# Patient Record
Sex: FEMALE | Race: BLACK OR AFRICAN AMERICAN | NOT HISPANIC OR LATINO | Employment: PART TIME | URBAN - METROPOLITAN AREA
[De-identification: names, ages, dates, MRNs, and addresses within clinical notes are randomized per-mention and may not be internally consistent; named-entity substitution may affect disease eponyms.]

---

## 2017-09-12 ENCOUNTER — CLINICAL SUPPORT (OUTPATIENT)
Dept: OCCUPATIONAL MEDICINE | Facility: CLINIC | Age: 37
End: 2017-09-12

## 2017-09-12 DIAGNOSIS — Z02.1 DRUG SCREENING, PRE-EMPLOYMENT: Primary | ICD-10-CM

## 2017-09-12 PROCEDURE — 80305 DRUG TEST PRSMV DIR OPT OBS: CPT | Mod: S$GLB,,, | Performed by: NURSE PRACTITIONER

## 2019-05-28 ENCOUNTER — HOSPITAL ENCOUNTER (EMERGENCY)
Facility: HOSPITAL | Age: 39
Discharge: HOME | End: 2019-05-28
Attending: EMERGENCY MEDICINE

## 2019-05-28 VITALS
WEIGHT: 185 LBS | TEMPERATURE: 98 F | OXYGEN SATURATION: 99 % | HEART RATE: 80 BPM | DIASTOLIC BLOOD PRESSURE: 100 MMHG | SYSTOLIC BLOOD PRESSURE: 168 MMHG | RESPIRATION RATE: 16 BRPM

## 2019-05-28 DIAGNOSIS — R03.0 ELEVATED BLOOD PRESSURE READING: ICD-10-CM
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
## 2019-05-28 PROCEDURE — 99281 EMR DPT VST MAYX REQ PHY/QHP: CPT | Mod: ER

## 2019-05-28 NOTE — DISCHARGE INSTRUCTIONS
Establish care with a primary care physician.    Please return to this ED if any other problems occur.
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
## 2020-02-06 ENCOUNTER — HOSPITAL ENCOUNTER (EMERGENCY)
Facility: HOSPITAL | Age: 40
Discharge: HOME | End: 2020-02-06
Attending: EMERGENCY MEDICINE
Payer: COMMERCIAL

## 2020-02-06 VITALS
TEMPERATURE: 98 F | BODY MASS INDEX: 28.93 KG/M2 | RESPIRATION RATE: 18 BRPM | HEART RATE: 76 BPM | SYSTOLIC BLOOD PRESSURE: 143 MMHG | OXYGEN SATURATION: 99 % | WEIGHT: 180 LBS | DIASTOLIC BLOOD PRESSURE: 78 MMHG | HEIGHT: 66 IN

## 2020-02-06 DIAGNOSIS — J06.9 URI WITH COUGH AND CONGESTION: Primary | ICD-10-CM

## 2020-02-06 LAB
B-HCG UR QL: NEGATIVE
CTP QC/QA: YES
CTP QC/QA: YES
POC MOLECULAR INFLUENZA A AGN: NEGATIVE
POC MOLECULAR INFLUENZA B AGN: NEGATIVE

## 2020-02-06 PROCEDURE — 99284 EMERGENCY DEPT VISIT MOD MDM: CPT | Mod: 25,ER

## 2020-02-06 PROCEDURE — 81025 URINE PREGNANCY TEST: CPT | Mod: ER | Performed by: NURSE PRACTITIONER

## 2020-02-06 PROCEDURE — 63600175 PHARM REV CODE 636 W HCPCS: Mod: ER | Performed by: NURSE PRACTITIONER

## 2020-02-06 PROCEDURE — 87502 INFLUENZA DNA AMP PROBE: CPT | Mod: ER

## 2020-02-06 PROCEDURE — 96372 THER/PROPH/DIAG INJ SC/IM: CPT | Mod: ER

## 2020-02-06 RX ORDER — FLUTICASONE PROPIONATE 50 MCG
1 SPRAY, SUSPENSION (ML) NASAL 2 TIMES DAILY
Qty: 15 G | Refills: 0 | OUTPATIENT
Start: 2020-02-06 | End: 2020-02-11

## 2020-02-06 RX ORDER — BENZONATATE 100 MG/1
100 CAPSULE ORAL EVERY 8 HOURS PRN
Qty: 20 CAPSULE | Refills: 0 | Status: SHIPPED | OUTPATIENT
Start: 2020-02-06

## 2020-02-06 RX ORDER — LEVOCETIRIZINE DIHYDROCHLORIDE 5 MG/1
5 TABLET, FILM COATED ORAL NIGHTLY
Qty: 30 TABLET | Refills: 0 | Status: SHIPPED | OUTPATIENT
Start: 2020-02-06

## 2020-02-06 RX ORDER — DEXAMETHASONE SODIUM PHOSPHATE 4 MG/ML
4 INJECTION, SOLUTION INTRA-ARTICULAR; INTRALESIONAL; INTRAMUSCULAR; INTRAVENOUS; SOFT TISSUE
Status: COMPLETED | OUTPATIENT
Start: 2020-02-06 | End: 2020-02-06

## 2020-02-06 RX ADMIN — DEXAMETHASONE SODIUM PHOSPHATE 4 MG: 4 INJECTION, SOLUTION INTRA-ARTICULAR; INTRALESIONAL; INTRAMUSCULAR; INTRAVENOUS; SOFT TISSUE at 09:02

## 2020-02-06 NOTE — ED PROVIDER NOTES
Encounter Date: 2/6/2020    SCRIBE #1 NOTE: I, Kelle Sauer, am scribing for, and in the presence of,  Toussaint Battley, FNP. I have scribed the following portions of the note - Other sections scribed: HPI, ROS, PE.       History     Chief Complaint   Patient presents with    Cough     sinus, body aches, sore throat, onset saturday     The history is provided by the patient. No  was used.   Cough   This is a new problem. The current episode started several days ago. The problem occurs every few minutes. The problem has been unchanged. The cough is non-productive. There has been no fever. Associated symptoms include sore throat and myalgias. Pertinent negatives include no chest pain, no chills, no sweats, no ear pain, no headaches, no rhinorrhea, no shortness of breath, no wheezing and no eye redness. She has tried cough syrup and decongestants for the symptoms. The treatment provided no relief. She is not a smoker.   Sore Throat    This is a new problem. The current episode started several days ago. The problem has been unchanged. There has been no fever. Associated symptoms include congestion, coughing and a hoarse voice. Pertinent negatives include no abdominal pain, diarrhea, ear pain, headaches, neck pain, shortness of breath, stridor, trouble swallowing or vomiting. She has tried acetaminophen and NSAIDs for the symptoms. The treatment provided no relief.     Review of patient's allergies indicates:   Allergen Reactions    Latex Itching     History reviewed. No pertinent past medical history.  Past Surgical History:   Procedure Laterality Date    ABLATION       History reviewed. No pertinent family history.  Social History     Tobacco Use    Smoking status: Never Smoker    Smokeless tobacco: Never Used   Substance Use Topics    Alcohol use: Yes     Comment: occ    Drug use: Not on file     Review of Systems   Constitutional: Negative.  Negative for chills and fever.   HENT: Positive for  congestion, hoarse voice, sinus pressure, sinus pain, sore throat and voice change (hoarse). Negative for ear pain, rhinorrhea and trouble swallowing.    Eyes: Negative.  Negative for redness.   Respiratory: Positive for cough. Negative for shortness of breath, wheezing and stridor.    Cardiovascular: Negative.  Negative for chest pain.   Gastrointestinal: Negative.  Negative for abdominal pain, diarrhea, nausea and vomiting.   Endocrine: Negative.    Genitourinary: Negative.  Negative for dysuria.   Musculoskeletal: Positive for myalgias. Negative for neck pain.   Skin: Negative.  Negative for rash.   Allergic/Immunologic: Negative.    Neurological: Negative.  Negative for headaches.   Hematological: Negative.  Negative for adenopathy.   Psychiatric/Behavioral: Negative.  Negative for behavioral problems.   All other systems reviewed and are negative.      Physical Exam     Initial Vitals [02/06/20 0909]   BP Pulse Resp Temp SpO2   (!) 158/100 80 19 98.3 °F (36.8 °C) 98 %      MAP       --         Physical Exam    Nursing note and vitals reviewed.  Constitutional: She appears well-developed and well-nourished.   HENT:   Head: Normocephalic and atraumatic.   Right Ear: Tympanic membrane, external ear and ear canal normal.   Left Ear: Tympanic membrane, external ear and ear canal normal.   Nose: Mucosal edema present.   Mouth/Throat: Uvula is midline, oropharynx is clear and moist and mucous membranes are normal.   Eyes: Conjunctivae and EOM are normal. Pupils are equal, round, and reactive to light.   Neck: Normal range of motion and phonation normal. Neck supple.   Cardiovascular: Normal rate, regular rhythm, S1 normal, S2 normal, normal heart sounds and intact distal pulses. Exam reveals no gallop and no friction rub.    No murmur heard.  Pulmonary/Chest: Effort normal and breath sounds normal. No stridor. No respiratory distress. She has no decreased breath sounds. She has no wheezes. She has no rhonchi. She has  no rales. She exhibits no tenderness.   Abdominal: Soft. She exhibits no distension. There is no tenderness. There is no rigidity.   Musculoskeletal: Normal range of motion. She exhibits no edema or tenderness.   Neurological: She is alert and oriented to person, place, and time. She has normal strength. No cranial nerve deficit or sensory deficit. GCS score is 15. GCS eye subscore is 4. GCS verbal subscore is 5. GCS motor subscore is 6.   Skin: Skin is warm and dry. Capillary refill takes less than 2 seconds. No rash noted.   Psychiatric: She has a normal mood and affect. Her behavior is normal.         ED Course   Procedures  Labs Reviewed   POCT INFLUENZA A/B MOLECULAR   POCT URINE PREGNANCY          Imaging Results    None          Medical Decision Making:   History:   Old Medical Records: I decided to obtain old medical records.  Initial Assessment:   URI  Differential Diagnosis:   Tonsillar exudate, influenza  Clinical Tests:   Lab Tests: Ordered and Reviewed  ED Management:  No evidence of tonsillar exudate. Lung sounds were clear and patient is afebrile and does not have history of smoking.  No imaging is warranted at this time.  Decadron IM given in the ER for the patient's symptoms.    1) Pt will be discharged home on Tessalon PerlesPaulazal on Flonase  2) Pt instructed to drink plenty of fluids to loosen secretions  3) Pt instructed that he may take over-the-counter Mucinex (NOT DM) as needed  4) Pt instructed to take over-the-counter Tylenol or Motrin for fever/body aches   5) Pt instructed to return to ER as needed if symptoms worsen/fail to improve  6) Pt instructed to follow-up with primary care provider tomorrow  7) Pt verbalized understanding of discharge instructions and treatment plan              Scribe Attestation:   Scribe #1: I performed the above scribed service and the documentation accurately describes the services I performed. I attest to the accuracy of the note.                           Clinical Impression:     1. URI with cough and congestion                                Toussaint Battley III, FNP  02/06/20 0946

## 2020-02-11 ENCOUNTER — HOSPITAL ENCOUNTER (EMERGENCY)
Facility: HOSPITAL | Age: 40
Discharge: HOME | End: 2020-02-11
Attending: EMERGENCY MEDICINE
Payer: COMMERCIAL

## 2020-02-11 VITALS
BODY MASS INDEX: 28.93 KG/M2 | OXYGEN SATURATION: 98 % | HEIGHT: 66 IN | RESPIRATION RATE: 19 BRPM | HEART RATE: 88 BPM | WEIGHT: 180 LBS | SYSTOLIC BLOOD PRESSURE: 155 MMHG | DIASTOLIC BLOOD PRESSURE: 102 MMHG | TEMPERATURE: 98 F

## 2020-02-11 DIAGNOSIS — I10 ESSENTIAL HYPERTENSION: ICD-10-CM

## 2020-02-11 DIAGNOSIS — R51.9 SINUS HEADACHE: Primary | ICD-10-CM

## 2020-02-11 LAB
B-HCG UR QL: NEGATIVE
CTP QC/QA: YES

## 2020-02-11 PROCEDURE — 81025 URINE PREGNANCY TEST: CPT | Mod: ER | Performed by: EMERGENCY MEDICINE

## 2020-02-11 PROCEDURE — 25000003 PHARM REV CODE 250: Mod: ER | Performed by: EMERGENCY MEDICINE

## 2020-02-11 PROCEDURE — 99284 EMERGENCY DEPT VISIT MOD MDM: CPT | Mod: 25,ER

## 2020-02-11 RX ORDER — AMLODIPINE BESYLATE 10 MG/1
10 TABLET ORAL DAILY
Qty: 30 TABLET | Refills: 0 | Status: SHIPPED | OUTPATIENT
Start: 2020-02-11 | End: 2021-02-10

## 2020-02-11 RX ORDER — FLUTICASONE PROPIONATE 50 MCG
2 SPRAY, SUSPENSION (ML) NASAL DAILY
Qty: 16 G | Refills: 0 | Status: SHIPPED | OUTPATIENT
Start: 2020-02-11

## 2020-02-11 RX ORDER — MONTELUKAST SODIUM 10 MG/1
10 TABLET ORAL NIGHTLY
Qty: 30 TABLET | Refills: 0 | Status: SHIPPED | OUTPATIENT
Start: 2020-02-11 | End: 2020-03-12

## 2020-02-11 RX ORDER — KETOROLAC TROMETHAMINE 10 MG/1
10 TABLET, FILM COATED ORAL
Status: COMPLETED | OUTPATIENT
Start: 2020-02-11 | End: 2020-02-11

## 2020-02-11 RX ORDER — PROMETHAZINE HYDROCHLORIDE AND DEXTROMETHORPHAN HYDROBROMIDE 6.25; 15 MG/5ML; MG/5ML
5 SYRUP ORAL NIGHTLY PRN
Qty: 118 ML | Refills: 0 | Status: SHIPPED | OUTPATIENT
Start: 2020-02-11 | End: 2020-02-21

## 2020-02-11 RX ORDER — BUTALBITAL, ACETAMINOPHEN AND CAFFEINE 50; 325; 40 MG/1; MG/1; MG/1
1 TABLET ORAL
Status: COMPLETED | OUTPATIENT
Start: 2020-02-11 | End: 2020-02-11

## 2020-02-11 RX ORDER — METHYLPREDNISOLONE 4 MG/1
TABLET ORAL
Qty: 1 PACKAGE | Refills: 0 | Status: SHIPPED | OUTPATIENT
Start: 2020-02-11 | End: 2020-03-03

## 2020-02-11 RX ADMIN — BUTALBITAL, ACETAMINOPHEN AND CAFFEINE 1 TABLET: 50; 325; 40 TABLET ORAL at 09:02

## 2020-02-11 RX ADMIN — KETOROLAC TROMETHAMINE 10 MG: 10 TABLET, FILM COATED ORAL at 09:02

## 2020-02-12 NOTE — ED TRIAGE NOTES
39 y.o. Female to ED with c.o. dizziness and headaches x 2 hours. Patient reports she took her blood pressure at home and it was 145/114. Patient reports she has never been prescribed blood pressure medication. Patient denies blurred vision, denies weakness, denies chest pain. Blood pressure 158/112 at triage.

## 2020-02-12 NOTE — ED PROVIDER NOTES
Encounter Date: 2/11/2020       History     Chief Complaint   Patient presents with    Hypertension     145/114    Headache     39 y.o. Female presents to the emergency department complaining of acute nasal congestion, runny nose, postnasal drip, productive cough, sneezing, right-sided, throbbing, 8/10, headache, intermittent dizziness (described as a room spinning sensation) that began two days ago.  She reports being seen for upper respiratory symptoms last week and states symptoms improved after receiving a steroid shot.  She reports taking her blood pressure today while she was at work and states her blood pressure was 145/114.  She reports coming to the ED concerned about elevated blood pressure.  She denies neck stiffness, photophobia, fever, nausea, vomiting, gait disturbance or focal weakness.  She denies taking medication for symptoms.    Does not acknowledge pre-existing HTN and states she does not take prescription BP medication.         Review of patient's allergies indicates:   Allergen Reactions    Latex Itching     No past medical history on file.  Past Surgical History:   Procedure Laterality Date    ABLATION       No family history on file.  Social History     Tobacco Use    Smoking status: Never Smoker    Smokeless tobacco: Never Used   Substance Use Topics    Alcohol use: Yes     Comment: occ    Drug use: Not on file     Review of Systems   Constitutional: Negative for fever.   HENT: Positive for congestion, postnasal drip, rhinorrhea, sinus pressure and sneezing. Negative for drooling, sore throat, trouble swallowing and voice change.    Eyes: Negative for photophobia and visual disturbance.   Musculoskeletal: Negative for neck stiffness.   Skin: Negative for rash.   Neurological: Positive for dizziness and headaches. Negative for syncope and weakness.   All other systems reviewed and are negative.      Physical Exam     Initial Vitals [02/11/20 2040]   BP Pulse Resp Temp SpO2   (!)  158/112 86 16 98 °F (36.7 °C) 98 %      MAP       --         Physical Exam    Nursing note and vitals reviewed.  Constitutional: She appears well-developed and well-nourished. She is not diaphoretic. No distress.   HENT:   Head: Normocephalic and atraumatic.   Right Ear: Tympanic membrane is injected and bulging. Tympanic membrane is not perforated and not erythematous. A middle ear effusion (serous) is present.   Left Ear: Tympanic membrane is bulging. Tympanic membrane is not injected, not perforated and not erythematous. A middle ear effusion (serous) is present.   Mouth/Throat: Oropharynx is clear and moist.   Eyes: Conjunctivae are normal. Pupils are equal, round, and reactive to light.   Neck: Normal range of motion, full passive range of motion without pain and phonation normal. Neck supple. No stridor present.   Cardiovascular: Normal rate and intact distal pulses.   Pulmonary/Chest: No accessory muscle usage or stridor. No tachypnea. No respiratory distress.   Abdominal: She exhibits no distension. There is no tenderness.   Musculoskeletal: Normal range of motion. She exhibits no tenderness.   Neurological: She is alert and oriented to person, place, and time. She has normal strength. Gait normal. GCS score is 15. GCS eye subscore is 4. GCS verbal subscore is 5. GCS motor subscore is 6.   Skin: Skin is warm. Capillary refill takes less than 2 seconds. No rash noted.   Psychiatric: She has a normal mood and affect.         ED Course   Procedures  Labs Reviewed   POCT URINE PREGNANCY          Imaging Results    None          Medical Decision Making:   ED Management:  Per chart review, BP elevated during multiple previous ED and clinic visits. Will start amlodipine and refer to PCP for blood pressure recheck.                           Labs Reviewed  Admission on 02/11/2020, Discharged on 02/11/2020   Component Date Value Ref Range Status    POC Preg Test, Ur 02/11/2020 Negative  Negative Final    Quality  Control Acceptable 02/11/2020 Yes   Final        Imaging Reviewed    Imaging Results    None         Medications given in ED    Medications   ketorolac tablet 10 mg (10 mg Oral Given 2/11/20 2116)   butalbital-acetaminophen-caffeine -40 mg per tablet 1 tablet (1 tablet Oral Given 2/11/20 2116)       Note was created using voice recognition software. Note may have occasional typographical errors that may not have been identified and edited despite good jenaro initial review prior to signing.           2/11/20 2/6/20 5/28/19   Vitals      /102 143/78 168/100           Discharge Medications     Discharge Medication List as of 2/11/2020  9:36 PM      START taking these medications    Details   amLODIPine (NORVASC) 10 MG tablet Take 1 tablet (10 mg total) by mouth once daily., Starting Tue 2/11/2020, Until Wed 2/10/2021, Normal      fluticasone propionate (FLONASE) 50 mcg/actuation nasal spray 2 sprays (100 mcg total) by Each Nostril route once daily., Starting Tue 2/11/2020, Normal      methylPREDNISolone (MEDROL DOSEPACK) 4 mg tablet Take as directed, Normal      montelukast (SINGULAIR) 10 mg tablet Take 1 tablet (10 mg total) by mouth every evening., Starting Tue 2/11/2020, Until Thu 3/12/2020, Normal      promethazine-dextromethorphan (PROMETHAZINE-DM) 6.25-15 mg/5 mL Syrp Take 5 mLs by mouth nightly as needed (cough)., Starting Tue 2/11/2020, Until Fri 2/21/2020, Normal         CONTINUE these medications which have NOT CHANGED    Details   benzonatate (TESSALON) 100 MG capsule Take 1 capsule (100 mg total) by mouth every 8 (eight) hours as needed for Cough., Starting Thu 2/6/2020, Print      levocetirizine (XYZAL) 5 MG tablet Take 1 tablet (5 mg total) by mouth every evening., Starting Thu 2/6/2020, Print                   Patient discharged to home in stable condition with instructions to:   1. Please take all meds as prescribed.  2. Follow-up with your primary care doctor   3. Return precautions  discussed and patient and/or family/caretaker understands to return to the emergency room for any concerns including worsening of your current symptoms, fever, chills, night sweats, worsening pain, chest pain, shortness of breath, nausea, vomiting, diarrhea, bleeding, headache, difficulty talking, visual disturbances, weakness, numbness or any other acute concerns      Clinical Impression:       ICD-10-CM ICD-9-CM   1. Sinus headache R51 784.0   2. Essential hypertension I10 401.9         Disposition:   Disposition: Discharged  Condition: Stable                     Cary Wharton MD  02/11/20 3682     Statement Selected

## 2020-11-18 ENCOUNTER — HOSPITAL ENCOUNTER (EMERGENCY)
Facility: HOSPITAL | Age: 40
Discharge: HOME | End: 2020-11-18
Attending: EMERGENCY MEDICINE

## 2020-11-18 VITALS
RESPIRATION RATE: 18 BRPM | TEMPERATURE: 98 F | OXYGEN SATURATION: 100 % | HEART RATE: 68 BPM | BODY MASS INDEX: 30.67 KG/M2 | WEIGHT: 190 LBS | DIASTOLIC BLOOD PRESSURE: 95 MMHG | SYSTOLIC BLOOD PRESSURE: 143 MMHG

## 2020-11-18 DIAGNOSIS — R52 PAIN: Primary | ICD-10-CM

## 2020-11-18 DIAGNOSIS — M77.31 HEEL SPUR, RIGHT: ICD-10-CM

## 2020-11-18 PROCEDURE — 99284 EMERGENCY DEPT VISIT MOD MDM: CPT | Mod: 25,ER

## 2020-11-18 PROCEDURE — 63600175 PHARM REV CODE 636 W HCPCS: Mod: ER | Performed by: PHYSICIAN ASSISTANT

## 2020-11-18 PROCEDURE — 96372 THER/PROPH/DIAG INJ SC/IM: CPT | Mod: ER

## 2020-11-18 RX ORDER — HYDROCODONE BITARTRATE AND ACETAMINOPHEN 5; 325 MG/1; MG/1
1 TABLET ORAL EVERY 4 HOURS PRN
Qty: 12 TABLET | Refills: 0 | Status: SHIPPED | OUTPATIENT
Start: 2020-11-18

## 2020-11-18 RX ORDER — KETOROLAC TROMETHAMINE 30 MG/ML
30 INJECTION, SOLUTION INTRAMUSCULAR; INTRAVENOUS
Status: COMPLETED | OUTPATIENT
Start: 2020-11-18 | End: 2020-11-18

## 2020-11-18 RX ORDER — METHYLPREDNISOLONE SOD SUCC 125 MG
125 VIAL (EA) INJECTION
Status: COMPLETED | OUTPATIENT
Start: 2020-11-18 | End: 2020-11-18

## 2020-11-18 RX ORDER — IBUPROFEN 800 MG/1
800 TABLET ORAL EVERY 6 HOURS PRN
Qty: 30 TABLET | Refills: 0 | OUTPATIENT
Start: 2020-11-18 | End: 2020-11-29

## 2020-11-18 RX ADMIN — KETOROLAC TROMETHAMINE 30 MG: 30 INJECTION, SOLUTION INTRAMUSCULAR at 07:11

## 2020-11-18 RX ADMIN — METHYLPREDNISOLONE SODIUM SUCCINATE 125 MG: 125 INJECTION, POWDER, FOR SOLUTION INTRAMUSCULAR; INTRAVENOUS at 07:11

## 2020-11-18 NOTE — Clinical Note
"Catalina Agrawal" Mau was seen and treated in our emergency department on 11/18/2020.  She may return to work on 11/23/2020.       If you have any questions or concerns, please don't hesitate to call.      TOSHIA oBwer"

## 2020-11-18 NOTE — Clinical Note
"Catalina Agrawal" Mau was seen and treated in our emergency department on 11/18/2020.  She may return to work on 11/23/2020.       If you have any questions or concerns, please don't hesitate to call.      TOSHIA Bower"

## 2020-11-19 NOTE — ED NOTES
"To ER room 4 with c/o R inner foot pain x 1 month, worsening today "I heard a pop"; reports inability to fully bear weight d/t pain; mild swelling noted to inner arch area and tender to palp; no distress noted; resp even and unlabored; will monitor closely  "

## 2020-11-19 NOTE — ED PROVIDER NOTES
"Encounter Date: 11/18/2020    SCRIBE #1 NOTE: I, Sparkle Hwang, am scribing for, and in the presence of,  TOSHIA Paredes. I have scribed the following portions of the note - Other sections scribed: HPI, ROS, PE.       History     Chief Complaint   Patient presents with    Foot Pain     Pt states," I have had pain in the bottom of my right foot. Today I got up to walk and I felt something pop in my left foot."     Catalina León is a 40 y.o. female with HTN who presents to the ED complaining of acute pain localized to base of right heel x 1 week ago, worsening today. Patient reports she was walking today when suddenly mid step she heard a "pop" in her right foot. Endorses antalgic gait (favoring right foot). Reports tingling. Denies relief s/p massaging with ice. Denies weakness and numbness. Denies joint swelling.     The history is provided by the patient. No  was used.     Review of patient's allergies indicates:   Allergen Reactions    Latex Itching     Past Medical History:   Diagnosis Date    Hypertension      Past Surgical History:   Procedure Laterality Date    ABLATION       History reviewed. No pertinent family history.  Social History     Tobacco Use    Smoking status: Never Smoker    Smokeless tobacco: Never Used   Substance Use Topics    Alcohol use: Yes     Comment: occ    Drug use: Not on file     Review of Systems   Constitutional: Negative.  Negative for chills and fever.   HENT: Negative.  Negative for sore throat.    Eyes: Negative.    Respiratory: Negative.  Negative for shortness of breath.    Cardiovascular: Negative.  Negative for chest pain.   Gastrointestinal: Negative.  Negative for nausea and vomiting.   Endocrine: Negative.    Genitourinary: Negative.  Negative for dysuria.   Musculoskeletal: Positive for arthralgias and gait problem. Negative for joint swelling and myalgias.   Skin: Negative.  Negative for color change, rash and wound.   Allergic/Immunologic: " Negative.    Neurological: Negative for syncope, weakness, numbness and headaches.        + tingling   Hematological: Negative.  Negative for adenopathy.   Psychiatric/Behavioral: Negative.  Negative for behavioral problems.   All other systems reviewed and are negative.      Physical Exam     Initial Vitals   BP Pulse Resp Temp SpO2   11/18/20 1809 11/18/20 1809 11/18/20 1809 11/18/20 1809 11/18/20 1841   (!) 167/104 72 16 98 °F (36.7 °C) 100 %      MAP       --                Physical Exam    Nursing note and vitals reviewed.  Constitutional: She appears well-developed and well-nourished.   HENT:   Head: Normocephalic and atraumatic.   Nose: Nose normal.   Eyes: Conjunctivae are normal.   Neck: Normal range of motion and phonation normal. Neck supple. No stridor present.   Cardiovascular: Normal rate and intact distal pulses.   Pulmonary/Chest: Effort normal. No stridor. No respiratory distress.   Musculoskeletal: Normal range of motion. No tenderness or edema.      Comments: Plantar aspect of right heel is tender to palpation without edema, erythema and/or warmth. Full ROM. Sensation intact.    Neurological: She is alert and oriented to person, place, and time. Gait normal.   Skin: Skin is warm and dry. No rash noted.   Psychiatric: She has a normal mood and affect. Her behavior is normal.         ED Course   Procedures  Labs Reviewed - No data to display       Imaging Results          X-Ray Foot Complete Right (Final result)  Result time 11/18/20 18:34:08    Final result by Marilynn Tello MD (11/18/20 18:34:08)                 Impression:      No acute osseous abnormality identified.      Electronically signed by: Mairlynn Tello MD  Date:    11/18/2020  Time:    18:34             Narrative:    EXAMINATION:  XR FOOT COMPLETE 3 VIEW RIGHT    CLINICAL HISTORY:  . Pain, unspecified    TECHNIQUE:  AP, lateral, and oblique views of the right foot were performed.    COMPARISON:  None    FINDINGS:  No evidence of  acute displaced fracture, dislocation, or osseous destructive process.  Lisfranc articulation is congruent.  No radiopaque retained foreign body seen.                                 Medical Decision Making:   History:   Old Medical Records: I decided to obtain old medical records.  Initial Assessment:   Patient has a heel spur seen on x-ray.  Patient has tenderness to plantar aspect of right foot near heel spur.  There is no erythema or swelling noted.  No tenderness or swelling along Achilles tendon.  Patient has full range of motion of right foot.  I suspect patient's symptoms is due to heel spur.  No evidence of fractures seen on x-ray.  I will discharge patient home with ibuprofen, Norco, and crutches.  Patient instructed to follow-up with podiatrist.  Patient is stable for discharge.  Independently Interpreted Test(s):   I have ordered and independently interpreted X-rays - see prior notes.  Clinical Tests:   Radiological Study: Ordered and Reviewed            Scribe Attestation:   Scribe #1: I performed the above scribed service and the documentation accurately describes the services I performed. I attest to the accuracy of the note.               The document was produced by a scribe under my direction and in my presence.  I agree with the content of the note and have made any necessary edits.    Marisol POPE        Clinical Impression:       ICD-10-CM ICD-9-CM   1. Pain  R52 780.96   2. Heel spur, right  M77.31 726.73                          ED Disposition Condition    Discharge Stable        ED Prescriptions     Medication Sig Dispense Start Date End Date Auth. Provider    ibuprofen (ADVIL,MOTRIN) 800 MG tablet Take 1 tablet (800 mg total) by mouth every 6 (six) hours as needed. 30 tablet 11/18/2020  TOSHIA Bower    HYDROcodone-acetaminophen (NORCO) 5-325 mg per tablet Take 1 tablet by mouth every 4 (four) hours as needed. 12 tablet 11/18/2020  TOSHIA Bower        Follow-up  Information     Follow up With Specialties Details Why Contact Info    Evy Borden DPM Podiatry, Wound Care Schedule an appointment as soon as possible for a visit   [address] [facility]  [place] [address]  [phone]                                         TOSHIA Bower  11/19/20 2013

## 2020-11-29 ENCOUNTER — HOSPITAL ENCOUNTER (EMERGENCY)
Facility: HOSPITAL | Age: 40
Discharge: HOME | End: 2020-11-29
Attending: INTERNAL MEDICINE

## 2020-11-29 VITALS
OXYGEN SATURATION: 99 % | RESPIRATION RATE: 16 BRPM | HEIGHT: 66 IN | HEART RATE: 78 BPM | TEMPERATURE: 99 F | DIASTOLIC BLOOD PRESSURE: 99 MMHG | SYSTOLIC BLOOD PRESSURE: 135 MMHG | BODY MASS INDEX: 30.53 KG/M2 | WEIGHT: 190 LBS

## 2020-11-29 DIAGNOSIS — M79.661 RIGHT CALF PAIN: ICD-10-CM

## 2020-11-29 DIAGNOSIS — M79.671 FOOT PAIN, RIGHT: ICD-10-CM

## 2020-11-29 LAB
B-HCG UR QL: NEGATIVE
CTP QC/QA: YES

## 2020-11-29 PROCEDURE — 99284 EMERGENCY DEPT VISIT MOD MDM: CPT | Mod: 25,ER

## 2020-11-29 PROCEDURE — 81025 URINE PREGNANCY TEST: CPT | Mod: ER | Performed by: NURSE PRACTITIONER

## 2020-11-29 PROCEDURE — 25000003 PHARM REV CODE 250: Mod: ER | Performed by: NURSE PRACTITIONER

## 2020-11-29 RX ORDER — NAPROXEN 250 MG/1
500 TABLET ORAL
Status: COMPLETED | OUTPATIENT
Start: 2020-11-29 | End: 2020-11-29

## 2020-11-29 RX ORDER — IBUPROFEN 800 MG/1
800 TABLET ORAL EVERY 8 HOURS PRN
Qty: 30 TABLET | Refills: 0 | Status: SHIPPED | OUTPATIENT
Start: 2020-11-29

## 2020-11-29 RX ADMIN — NAPROXEN 500 MG: 250 TABLET ORAL at 08:11

## 2020-11-29 NOTE — Clinical Note
"Catalina Agrawal" León was seen and treated in our emergency department on 11/29/2020.  She may return to work on 12/01/2020.       If you have any questions or concerns, please don't hesitate to call.      XIANG Aceves RN    "

## 2020-11-30 NOTE — ED PROVIDER NOTES
"Encounter Date: 11/29/2020    SCRIBE #1 NOTE: I, Delia Watts, am scribing for, and in the presence of,  Tom Hayden NP. I have scribed the following portions of the note - Other sections scribed: MOHIT ROS .       History     Chief Complaint   Patient presents with    Foot Pain     complains of right foot and ankle pain. states was dx with "bone spur" several weeks ago here and pain has not improved. denies any new injury or trauma.     Catalina León is a 40 y.o. female who presents to the ED complaining of right foot and ankle pain x several weeks. Was recently diagnosed with a bone spur but reports she is not improving. No new falls or trauma. She has been taking ibuprofen and tylenol for pain but reports no relief and cant see a pediotrist until next month. Denies any numbness, weakness, or tingling.       The history is provided by the patient. No  was used.     Review of patient's allergies indicates:   Allergen Reactions    Latex Itching     Past Medical History:   Diagnosis Date    Hypertension      Past Surgical History:   Procedure Laterality Date    ABLATION       History reviewed. No pertinent family history.  Social History     Tobacco Use    Smoking status: Never Smoker    Smokeless tobacco: Never Used   Substance Use Topics    Alcohol use: Yes     Comment: occ    Drug use: Not on file     Review of Systems   Constitutional: Negative for fever.   Respiratory: Negative for shortness of breath.    Cardiovascular: Negative for chest pain.   Musculoskeletal: Positive for arthralgias. Negative for joint swelling.   Skin: Negative for rash and wound.   Neurological: Negative for weakness and numbness.       Physical Exam     Initial Vitals [11/29/20 1928]   BP Pulse Resp Temp SpO2   (!) 133/105 87 14 98.5 °F (36.9 °C) 100 %      MAP       --         Physical Exam    Nursing note and vitals reviewed.  Constitutional: She appears well-developed and well-nourished. She is not " diaphoretic. She is cooperative.  Non-toxic appearance. No distress.   HENT:   Head: Normocephalic and atraumatic.   Right Ear: External ear normal.   Left Ear: External ear normal.   Neck: Normal range of motion.   Cardiovascular: Normal rate and regular rhythm.   Pulses:       Dorsalis pedis pulses are 2+ on the right side.   No right-sided calf swelling, posterior calf compartments soft.    Pulmonary/Chest: Effort normal. No stridor. No tachypnea and no bradypnea. No respiratory distress.   Abdominal: She exhibits no distension.   Musculoskeletal: Normal range of motion. Tenderness present.        Right ankle: She exhibits normal range of motion, no swelling, no ecchymosis and no deformity. No tenderness. No lateral malleolus, no medial malleolus and no proximal fibula tenderness found. Achilles tendon exhibits no pain, no defect and normal Foy's test results.      Right lower leg: She exhibits tenderness.        Right foot: Tenderness present. No bony tenderness, swelling or crepitus.        Feet:    Neurological: She is alert and oriented to person, place, and time. She has normal strength. No sensory deficit. Coordination and gait normal. GCS score is 15. GCS eye subscore is 4. GCS verbal subscore is 5. GCS motor subscore is 6.   Skin: Skin is warm, dry and intact. Capillary refill takes less than 2 seconds. No bruising and no rash noted. No cyanosis or erythema. Nails show no clubbing.   Psychiatric: She has a normal mood and affect. Her behavior is normal. Thought content normal.         ED Course   Procedures  Labs Reviewed   POCT URINE PREGNANCY          Imaging Results          X-Ray Foot Complete Right (In process)  Result time 11/29/20 20:42:46                 Medical Decision Making:   Clinical Tests:   Radiological Study: Ordered and Reviewed       APC / Resident Notes:   This is an evaluation of a 40 y.o. female that presents to the Emergency Department for right heel pain.  Seen here previously  for similar.  Reports diagnosed with heel spur.  X-ray from visit 11/18/2020 without evidence of acute displaced fracture dislocation.  Patient reports no new injuries. Physical Exam shows a non-toxic, afebrile, and well appearing female.  Tenderness palpation over the plantar heel as well as the medial and lateral posterior foot.  No tenderness over the plantar tendon .  2+ right DP pulse.  Normal sensation and perfusion to the distal toes.  Compartments soft.  Normal Foy test. TTP to the right posterior calf. Vital signs are reassuring. If available, previous records reviewed. RESULTS: UPT:  Negative. Xray of the foot without evidence of fracture or dislocation.     My overall impression is right foot/heel. I considered, but at this time, do not suspect acute displaced fracture, dislocation, septic joint, cellulitis, compartment syndrome, plantar fasciitis, Achilles rupture. US pending to r/o DVT. Care will be signed out to Dr. Patel pending results and final disposition. JON Guerrero, JAVED-MARTIN         Scribe Attestation:   Scribe #1: I performed the above scribed service and the documentation accurately describes the services I performed. I attest to the accuracy of the note.     I, Tom Hayden NP, personally performed the services described in this documentation. All medical record entries made by the scribe were at my direction and in my presence.  I have reviewed the chart and agree that the record reflects my personal performance and is accurate and complete                  Clinical Impression:     ICD-10-CM ICD-9-CM   1. Foot pain, right  M79.671 729.5   2. Right calf pain  M79.661 729.5                                               JAVED Sandoval  11/29/20 2051

## 2020-11-30 NOTE — ED NOTES
Pt refused to remain in lobby, states she will wait in vehicle. Obtained cell phone number to contact when room available.

## 2020-11-30 NOTE — ED TRIAGE NOTES
Pt presetnst to the ED c/o right foot pain and mild swelling x 3+ weeks. Was dx with bone spur in right heel and has not been able to follow up due to insurance issues. Pt states that ibuprofen and tylenol are no longer helping the pain and pain is now radiating to right calf r/t gait change. CMS intact, able to bear some weight, mild swelling noted to heel.